# Patient Record
(demographics unavailable — no encounter records)

---

## 2025-05-21 NOTE — DISCUSSION/SUMMARY
[de-identified] : Chief complaint Back pain   HPI Patient is a very pleasant 46-year-old here for evaluation of low back pain.  Is been on and off for the last year.  Pain is worse when he does anything strenuous.  No weakness.  No issues with gait or balance or bowel bladder incontinence.  The patient radiates pain into the back buttock with numbness and tingling.  Loss of the back of the knee.  Denies any weakness.  He has IBS and takes Advil intermittently and takes Tylenol.  No fevers or chills.  Patient denies any weakness.  No issues with gait or balance or bowel bladder incontinence   patient is NAD, AAOX3 skin is intact, NTTP in LS SPINE mild pain with ROM 5/5 motor strength in BLE SILT L1-S1 BLE POSITIVE straight leg raise Negative martha equal patella/achilles reflex normal gait  Imaging Review of AP lateral flexion-extension x-rays obtained [place] today demonstrates transitional level.  First true disc segment is labeled L5-S1.  There is a fractional curve with lateral tilting L5-S1 to the right there is L4-5 L5-S1 DDD.  Treatment note I discussed at length with the patient into the condition.  The patient presents with 1 year duration on and off pain with back and right-sided symptoms in the setting of degenerative scoliosis L4-5 L5-S1.  At this time we will begin with physical therapy.  Patient will take tizanidine as needed.  Will see the patient back in 4 weeks on the PA schedule.  If the symptoms persist, the patient will get a MRI.  All the patient's questions were sought and answered.  The patient is also complaining of cervical pain.  Will add the cervical PT.  I also discussed with her that he can get x-rays of her cervical spine at the next visit.

## 2025-07-10 NOTE — ASSESSMENT
[FreeTextEntry1] : 1- Right renal colic due to ureter stone for PACT. Flomax refilled and patient given precautions to use ER if pain is worse or if he gets fever  2- Discussed with the patient dietary stone prevention including: - Increase fluid intake to achieve at least 2.5-3 L daily - Limit sodium intake and consume 7963-2063 mg per day dietary calcium - Increase their citrate intake by increasing fruits, vegetables and limit non-dairy animal protein

## 2025-07-10 NOTE — HISTORY OF PRESENT ILLNESS
[FreeTextEntry1] :  Donald is a 46 years old with recent onset of 10 days ago had right renal colic CT at [facility] showed a 3mm stone (per patient). He had hematuria and dysuria and has been flomax. No fever, no UTI. No Stone passage. No Previous hx of stones. Proper hydration, was doing bike riding this summer. No previous surgery. No special Diet . He takes medication for IBS- has chronic diarrhea.  Uncle had Renal Tx. Occasional urgency IPSS 7 QOL 6.